# Patient Record
Sex: FEMALE | Race: WHITE
[De-identification: names, ages, dates, MRNs, and addresses within clinical notes are randomized per-mention and may not be internally consistent; named-entity substitution may affect disease eponyms.]

---

## 2023-08-03 ENCOUNTER — HOSPITAL ENCOUNTER (OUTPATIENT)
Dept: HOSPITAL 75 - RAD | Age: 68
End: 2023-08-03
Attending: FAMILY MEDICINE
Payer: MEDICARE

## 2023-08-03 DIAGNOSIS — Z12.31: Primary | ICD-10-CM

## 2023-08-03 DIAGNOSIS — N64.9: ICD-10-CM

## 2023-08-03 PROCEDURE — 77067 SCR MAMMO BI INCL CAD: CPT

## 2023-08-03 PROCEDURE — 77063 BREAST TOMOSYNTHESIS BI: CPT

## 2023-08-08 ENCOUNTER — HOSPITAL ENCOUNTER (OUTPATIENT)
Dept: HOSPITAL 75 - RAD | Age: 68
End: 2023-08-08
Attending: FAMILY MEDICINE
Payer: MEDICARE

## 2023-08-08 DIAGNOSIS — E89.2: ICD-10-CM

## 2023-08-08 DIAGNOSIS — N95.1: ICD-10-CM

## 2023-08-08 DIAGNOSIS — E21.3: ICD-10-CM

## 2023-08-08 DIAGNOSIS — E55.9: Primary | ICD-10-CM

## 2023-08-08 PROCEDURE — 77080 DXA BONE DENSITY AXIAL: CPT

## 2023-08-08 NOTE — DIAGNOSTIC IMAGING REPORT
INDICATION: Postmenopausal screening for osteoporosis.



COMPARISON: None



FINDINGS:



AP Spine L1-L4:  

[BMD (g/cm2): 1.332] [T-Score: 1.1] [Z-Score: 2.0]

[BMD Previous: na] [BMD % Change: na]



LT Hip Neck:       

[BMD (g/cm2): 0.947] [T-Score: -0.7] [Z-Score: 0.4]



LT Hip Total:       

[BMD (g/cm2):1.052] [T-Score:0.4] [Z-Score: 1.1]

[BMD Previous: na] [BMD % Change: na]



RT Hip Neck:      

[BMD (g/cm2):1.004] [T-Score:-0.2] [Z-Score:0.8]



RT Hip Total:      

[BMD (g/cm2):1.098] [T-score:0.7] [Z-Score:1.5]

[BMD Previous:na] [BMD % Change:na]



*Indicates significant change from prior examination based on 95%

confidence level.



World Health Organization criteria for BMD interpretation

classify patients as Normal (T-score at or above -1.0),

Osteopenic (T-score between -1.0 and -2.5) or Osteoporotic

(T-score at or below -2.5).



LIMITATIONS AND MODIFICATION:  None.



FRACTURE RISK (FRAX SCORE):

The ten year probability of (%): 

Major Osteoporotic Fracture: [na]

Hip Fracture: [na]



IMPRESSION:

1. Normal bone mineral density. 

2. Baseline examination.

3. See below National [employer]opor[employer] guidelines on when

to potentially initiate pharmacologic therapy. 



Based on the National Osteoporosis [employer] Guidelines,

pharmacologic treatment should be initiated in any of the

following, unless clinical conditions suggest otherwise:



*  Any patient with prior fragility fracture of the hip or

vertebrae. A spine fracture indicates 5X risk for subsequent

spine fracture and 2X risk for subsequent hip fracture.



*  Osteoporosis (T-score <-2.5).



*  Postmenopausal women and men age 50 and older with low bone

mass/osteopenia (T-score between -1.0 and -2.5) by DXA and

10-year major osteoporotic fracture greater than 20% or a 10-year

probability of hip fracture greater than 3%. These fracture risks

are supplied above in the FRAX score, if applicable.



*  Clinician judgement and/or patient preferences may indicate

treatment for people with 10-year fracture probabilities above or

below these levels.



Dictated by: 



  Dictated on workstation # [id number]

## 2023-08-10 NOTE — DIAGNOSTIC IMAGING REPORT
Indication: Routine screening.



Comparison is made with prior mammogram 06/14/2021.



2-D and 3-D bilateral screening mammography was performed with

CAD.



Both breasts are heterogeneously dense, limiting the sensitivity

of mammography. Cluster of microcalcifications in the central

right breast may be slightly more numerous on today's study.

These appear to be superiorly located on the MLO view. Additional

views are recommended. Calcifications in the upper left breast

appear about the same. No masses are seen. Axillae are

unremarkable.



IMPRESSION: BI-RADS 0



Right breast calcifications. Additional views are recommended for

further evaluation.



ACR BI-RADS Category 0: Incomplete. (Needs additional imaging

evaluation).

Result letter will be mailed to the patient.

Note: At least 10% of breast cancer is not imaged by mammography.



Dictated by: 



  Dictated on workstation # [id number]

## 2023-08-23 ENCOUNTER — HOSPITAL ENCOUNTER (OUTPATIENT)
Dept: HOSPITAL 75 - RAD | Age: 68
End: 2023-08-23
Attending: NURSE PRACTITIONER
Payer: MEDICARE

## 2023-08-23 DIAGNOSIS — R92.0: Primary | ICD-10-CM

## 2023-08-23 PROCEDURE — 77065 DX MAMMO INCL CAD UNI: CPT

## 2023-08-23 NOTE — DIAGNOSTIC IMAGING REPORT
INDICATION: 

Right breast calcifications. Patient presents for additional

views.



COMPARISON: 

Correlation is made with the prior screening mammogram from

08/03/2023.



TECHNIQUE: 

Unilateral right 2D and 3D diagnostic mammography was performed.

This included magnification CC and ML views as well as

conventional 90 degree lateral views.



FINDINGS:

There is a cluster of microcalcifications in the central right

breast on the CC view which appears to be superiorly located on

the medial lateral views. There may be some linear distribution

on the magnification ML view. DCIS cannot be entirely excluded.

No soft tissue mass is identified.



IMPRESSION: 

Increasing cluster of microcalcifications in the upper central

right breast, concerning for DCIS. Tissue sampling is

recommended. These would be amenable to a stereotactic biopsy

approach.



ACR BI-RADS Category 4: Suspicious abnormality.



Dictated by: 



  Dictated on workstation # [id number]

## 2023-08-30 ENCOUNTER — HOSPITAL ENCOUNTER (OUTPATIENT)
Dept: HOSPITAL 75 - RAD | Age: 68
End: 2023-08-30
Attending: FAMILY MEDICINE
Payer: MEDICARE

## 2023-08-30 VITALS — HEIGHT: 67.99 IN | BODY MASS INDEX: 26.13 KG/M2 | WEIGHT: 172.4 LBS

## 2023-08-30 DIAGNOSIS — R92.8: Primary | ICD-10-CM

## 2023-08-30 PROCEDURE — 19081 BX BREAST 1ST LESION STRTCTC: CPT

## 2023-08-30 NOTE — DIAGNOSTIC IMAGING REPORT
INDICATION: Right breast calcifications. 



EXAMINATION: Patient presents for stereotactic biopsy.



PROCEDURE: Patient was brought to the serotactic suite and placed

anterior in a sitting upright position. The right breast was

positioned craniocaudal. The cluster of microcalcifications in

the central right breast were stereotactically targeted. All

images were viewed on a dedicated workstation. The superior right

breast was then prepped and draped in the usual sterile fashion.

A small amount of 1% lidocaine was utilized for local anesthesia.

The 8-gauge vacuum-assisted needle was advanced into the right

breast from a craniocaudal approach and placed per stereotactic

coordinates. Four core biopsies were obtained. Specimen

radiograph was then obtained demonstrating numerous

calcifications within specimen labeled #2 and #3. Marker clip was

then deployed. The needle was removed and hemostasis was

obtained. Patient tolerated the procedure well and was sent for

postprocedure mammogram on dedicated mammographic equipment. 2D

CC and ML mammography was performed. Images demonstrated a marker

clip in the central right breast. Patient tolerated the procedure

well.



IMPRESSION: Successful stereotactic biopsy of right breast

calcifications utilizing the 8-gauge vacuum-assisted needle.

Pathology results are currently pending. 



Dictated by: 



  Dictated on workstation # [id number]